# Patient Record
(demographics unavailable — no encounter records)

---

## 2024-10-30 NOTE — PHYSICAL EXAM
[Normal Appearance] : normal appearance [General Appearance - In No Acute Distress] : no acute distress [Abdomen Soft] : soft [Abdomen Tenderness] : non-tender [] : no respiratory distress [Oriented To Time, Place, And Person] : oriented to person, place, and time [Normal Station and Gait] : the gait and station were normal for the patient's age [de-identified] : normal peripheral circulation

## 2024-10-30 NOTE — ADDENDUM
[FreeTextEntry1] :  Jessica BANERJEE assisted in documentation on 10/23/2024  acting as a scribe for Dr. Burton Freeman.

## 2024-10-30 NOTE — HISTORY OF PRESENT ILLNESS
[FreeTextEntry1] : 10/23/2024:  60-year-old male presents for follow-up.  Taking Flomax. Same urination.   Denies dysuria, hematuria, lower abdominal or flank pain, nausea, vomiting, fever, chills or rigors.  Had headaches with Sildenafil. Also had muscle aches in the past with Tadalafil.   Does not want to take the medication.    In the past: On Flomax, had slower stream, daytime frequency every 2-4 hours x and nocturia 1 x.  Denied hesitancy, straining, intermittency, urgency, incontinence or sense of incomplete emptying.  With Tadalafil 5 mg daily, minimal response had muscle ache.  In the past with Sildenafil 100 mg, 1/3rd tab had good response.    Initially seen for Benign Prostatic Hyperplasia.  Had been on Flomax for lower urinary tract symptoms.  Reported reasonable stream, urinates every few hours or so during the day depending on fluid: water, coffee/tea and soda intake. Nocturia of 0-1 x.  Endorsed post void dribbling.   Reported Erectile dysfunction. Rated erections as 3/5. Reported normal libido. Had tried Viagra in the past- caused flushing.  No family history of Prostate cancer.

## 2024-10-30 NOTE — PHYSICAL EXAM
[Normal Appearance] : normal appearance [General Appearance - In No Acute Distress] : no acute distress [Abdomen Soft] : soft [Abdomen Tenderness] : non-tender [] : no respiratory distress [Oriented To Time, Place, And Person] : oriented to person, place, and time [Normal Station and Gait] : the gait and station were normal for the patient's age [de-identified] : normal peripheral circulation

## 2024-10-30 NOTE — END OF VISIT
[Time Spent: ___ minutes] : I have spent [unfilled] minutes of time on the encounter which excludes teaching and separately reported services. [FreeTextEntry3] : Parts of this note were generated by using [employer] services and Dragon medical dictation software.  A reasonable effort was made to proofread and correct its contents, but typos and mistakes may still remain. If there are any questions or points of clarification needed, please contact my office.   Prior to appointment and during encounter with patient extensive medical records were reviewed including but not limited to, hospital records, outpatient records, imaging results and lab data if available. During this appointment the patient was examined, diagnoses were discussed and explained in a face-to-face manner. In addition, extensive time was spent reviewing aforementioned diagnostic studies. Counseling including test results, differential diagnoses, treatment options, risk and benefits, lifestyle changes, current condition, and current medications was performed. Patient's questions and concerns were addressed. Patient verbalized understanding of the treatment plan. Time spent is for reviewing chart, labs and images if available, counseling and care coordination.

## 2024-10-30 NOTE — ASSESSMENT
[FreeTextEntry1] : Benign Prostatic Hyperplasia: See uroflow and PVR.  Discussed treatment options, will continue Flomax.   PSA screening: Discussed PSA screening and latest recommendations/guidelines: USPTF and AUA. Mentioned as per comprehensive review and meta-analysis digital rectal exam exhibited a notably low diagnostic value for prostate cancer detection. With suggestion that it could be potentially omitted. Patient was agreeable with not doing digital rectal examination.  Patient is due for PSA this year. Will do next available with 48-hours of sexual abstinence.     Will inform results.   Return to office in 1 year or sooner if there are any issues. Will do uroflow and check PVR.

## 2024-10-30 NOTE — END OF VISIT
[Time Spent: ___ minutes] : I have spent [unfilled] minutes of time on the encounter which excludes teaching and separately reported services. [FreeTextEntry3] : Parts of this note were generated by using [employer] services and Dragon medical dictation software.  A reasonable effort was made to proofread and correct its contents, but typos and mistakes may still remain. If there are any questions or points of clarification needed, please contact my office.   Prior to appointment and during encounter with patient extensive medical records were reviewed including but not limited to, hospital records, outpatient records, imaging results and lab data if available. During this appointment the patient was examined, diagnoses were discussed and explained in a face-to-face manner. In addition, extensive time was spent reviewing aforementioned diagnostic studies. Counseling including test results, differential diagnoses, treatment options, risk and benefits, lifestyle changes, current condition, and current medications was performed. Patient's questions and concerns were addressed. Patient verbalized understanding of the treatment plan. Time spent is for reviewing chart, labs and images if available, counseling and care coordination.

## 2024-10-30 NOTE — ASSESSMENT
Contacted pt.  Brother asked that I call back around 10:00 as pt is currently sleeping.     Spoke with pt in regards to comprehensive breast clinic. Explained clinic in full detail. She is interested in this clinic.  Once we receive receptors I will reach out to pt to schedule. This can take up to 14 days. My contact info was given to pt and brother to contact me with any questions or concerns. They verbalized understanding.    [FreeTextEntry1] : Benign Prostatic Hyperplasia: See uroflow and PVR.  Discussed treatment options, will continue Flomax.   PSA screening: Discussed PSA screening and latest recommendations/guidelines: USPTF and AUA. Mentioned as per comprehensive review and meta-analysis digital rectal exam exhibited a notably low diagnostic value for prostate cancer detection. With suggestion that it could be potentially omitted. Patient was agreeable with not doing digital rectal examination.  Patient is due for PSA this year. Will do next available with 48-hours of sexual abstinence.     Will inform results.   Return to office in 1 year or sooner if there are any issues. Will do uroflow and check PVR.

## 2025-03-18 NOTE — HISTORY OF PRESENT ILLNESS
[FreeTextEntry1] : Patient with history of HLD, PUD and family history of CAD.  Is an active individual. Notices chest and throat discomfort with stress, noticing for the past month, lasting for 10-15 minutes with self resolution. He has been having a burning sensation with stress. He walks 3-4 miles. Gym- uses weights.  2019- Cardiac cta - 0 calcium socre and normal coronaries.  4/2022- Increasing blood pressures. Has been noticing some headaches. No LE edema. Walks three miles a day. Does meditation.   5/2022- Higher BPs with home machine. Calibrated. ~10-15 pts higher. Home diary with BPs of 120s-140s.   2/2023- At home BPs ~130-140s. Occasional 120s. Walking 2.5 miles every day.   2/2024- Stable at home bPs. no new symptoms. walking regularly.   3/2025- Elevated bps in office. Doing well. No symptoms.

## 2025-03-18 NOTE — DISCUSSION/SUMMARY
[FreeTextEntry1] : 1. Chest pain- No further symptoms. exercise stress test.  2. Htn- Bp diary - for 6 weeks.  3. HLD- Calcium score repeat since >6 years.  CMP, Lipid panel, CBC, TSH.  4. Follow up TEB in 6-8 weeks.    [EKG obtained to assist in diagnosis and management of assessed problem(s)] : EKG obtained to assist in diagnosis and management of assessed problem(s)